# Patient Record
Sex: MALE | Race: WHITE | Employment: FULL TIME | URBAN - METROPOLITAN AREA
[De-identification: names, ages, dates, MRNs, and addresses within clinical notes are randomized per-mention and may not be internally consistent; named-entity substitution may affect disease eponyms.]

---

## 2023-05-01 ENCOUNTER — HOSPITAL ENCOUNTER (OUTPATIENT)
Dept: CT IMAGING | Age: 43
Discharge: HOME | End: 2023-05-04
Payer: COMMERCIAL

## 2023-05-01 VITALS
OXYGEN SATURATION: 98 % | DIASTOLIC BLOOD PRESSURE: 75 MMHG | SYSTOLIC BLOOD PRESSURE: 134 MMHG | RESPIRATION RATE: 14 BRPM | HEART RATE: 70 BPM

## 2023-05-01 DIAGNOSIS — R07.2 PRECORDIAL PAIN: ICD-10-CM

## 2023-05-01 PROCEDURE — 6360000004 HC RX CONTRAST MEDICATION: Performed by: INTERNAL MEDICINE

## 2023-05-01 PROCEDURE — 3209999900 CT CARDIAC OVER READ

## 2023-05-01 PROCEDURE — 6370000000 HC RX 637 (ALT 250 FOR IP): Performed by: INTERNAL MEDICINE

## 2023-05-01 PROCEDURE — 75574 CT ANGIO HRT W/3D IMAGE: CPT

## 2023-05-01 RX ORDER — NITROGLYCERIN 0.4 MG/1
0.4 TABLET SUBLINGUAL ONCE
Status: COMPLETED | OUTPATIENT
Start: 2023-05-01 | End: 2023-05-01

## 2023-05-01 RX ADMIN — IOPAMIDOL 100 ML: 755 INJECTION, SOLUTION INTRAVENOUS at 07:48

## 2023-05-01 RX ADMIN — NITROGLYCERIN 0.4 MG: 0.4 TABLET, ORALLY DISINTEGRATING SUBLINGUAL at 08:05

## 2023-05-02 PROCEDURE — 75574 CT ANGIO HRT W/3D IMAGE: CPT | Performed by: INTERNAL MEDICINE

## 2023-05-03 ENCOUNTER — TELEPHONE (OUTPATIENT)
Dept: CARDIOLOGY CLINIC | Age: 43
End: 2023-05-03

## 2023-09-07 ENCOUNTER — OFFICE VISIT (OUTPATIENT)
Age: 43
End: 2023-09-07
Payer: COMMERCIAL

## 2023-09-07 VITALS
HEIGHT: 72 IN | DIASTOLIC BLOOD PRESSURE: 88 MMHG | WEIGHT: 171 LBS | HEART RATE: 75 BPM | BODY MASS INDEX: 23.16 KG/M2 | SYSTOLIC BLOOD PRESSURE: 132 MMHG

## 2023-09-07 DIAGNOSIS — I49.3 PVC (PREMATURE VENTRICULAR CONTRACTION): ICD-10-CM

## 2023-09-07 DIAGNOSIS — I10 PRIMARY HYPERTENSION: Primary | ICD-10-CM

## 2023-09-07 PROCEDURE — 99214 OFFICE O/P EST MOD 30 MIN: CPT | Performed by: INTERNAL MEDICINE

## 2023-09-07 PROCEDURE — 3075F SYST BP GE 130 - 139MM HG: CPT | Performed by: INTERNAL MEDICINE

## 2023-09-07 PROCEDURE — 3079F DIAST BP 80-89 MM HG: CPT | Performed by: INTERNAL MEDICINE

## 2023-09-07 RX ORDER — VORTIOXETINE 20 MG/1
TABLET, FILM COATED ORAL
COMMUNITY
Start: 2023-07-25

## 2023-09-07 RX ORDER — AMLODIPINE BESYLATE 5 MG/1
5 TABLET ORAL DAILY
Qty: 30 TABLET | Refills: 12 | Status: SHIPPED | OUTPATIENT
Start: 2023-09-07

## 2023-09-07 RX ORDER — FLECAINIDE ACETATE 100 MG/1
100 TABLET ORAL 2 TIMES DAILY
Qty: 30 TABLET | Refills: 5 | Status: SHIPPED | OUTPATIENT
Start: 2023-09-07

## 2023-09-07 RX ORDER — LOSARTAN POTASSIUM 50 MG/1
50 TABLET ORAL 2 TIMES DAILY
Qty: 60 TABLET | Refills: 11 | Status: SHIPPED | OUTPATIENT
Start: 2023-09-07

## 2023-09-07 RX ORDER — BUTALBITAL, ACETAMINOPHEN AND CAFFEINE 50; 325; 40 MG/1; MG/1; MG/1
1 TABLET ORAL DAILY PRN
COMMUNITY
Start: 2022-10-19

## 2023-09-07 NOTE — PROGRESS NOTES
[facility] CARDIOLOGY  [id number] [place] [address]  PHONE: [phone]      23    NAME:  Lashaun Quinn  : 1980  MRN: [id number]         SUBJECTIVE:   Lashaun Quinn is a 41 y.o. male seen for a follow up visit regarding the following:     Chief Complaint   Patient presents with    Follow-up    Irregular Heart Beat            HPI:  Follow up  Follow-up and Irregular Heart Beat   . Mr. Jaya Cope presents today for follow-up. Palpitations are slightly improved with the flecainide. Continues to have chest discomfort. No new complaints today. Irregular Heart Beat   Associated symptoms include an irregular heartbeat. Key CAD CHF Meds            amLODIPine (NORVASC) 5 MG tablet (Taking)    Sig - Route: Take 1 tablet by mouth daily Indications: AS NEEDED - Oral    losartan (COZAAR) 50 MG tablet (Taking)    Sig - Route: Take 1 tablet by mouth in the morning and at bedtime - Oral    propranolol (INDERAL XL) 80 MG extended release capsule (Taking)    Sig - Route: Take 1 capsule by mouth 2 times daily - Oral    pravastatin (PRAVACHOL) 20 MG tablet (Taking)    Class: Historical Med          Key Antihyperglycemic Medications       Patient is on no antihyperglycemic meds. Past Medical History, Past Surgical History, Family history, Social History, and Medications were all reviewed with the patient today and updated as necessary. Prior to Admission medications    Medication Sig Start Date End Date Taking?  Authorizing Provider   butalbital-acetaminophen-caffeine (FIORICET, ESGIC) -36 MG per tablet Take 1 tablet by mouth daily as needed 10/19/22  Yes Historical Provider, MD   TRINTELLIX 20 MG TABS tablet  23  Yes Historical Provider, MD   flecainide (TAMBOCOR) 100 MG tablet Take 1 tablet by mouth 2 times daily 23  Yes Ziggy Luna III, MD   amLODIPine (NORVASC) 5 MG tablet Take 1 tablet by mouth daily Indications: AS NEEDED 23  Yes

## 2024-07-26 DIAGNOSIS — I49.3 PVC (PREMATURE VENTRICULAR CONTRACTION): ICD-10-CM

## 2024-07-26 NOTE — TELEPHONE ENCOUNTER
MEDICATION REFILL REQUEST      Name of Medication:  Flecainide  Dose:  50 mg  Frequency:  BID  Quantity:  60  Days' supply:  30 with refills      Pharmacy Name/Location:  [id number]

## 2024-07-26 NOTE — TELEPHONE ENCOUNTER
Requested Prescriptions     Pending Prescriptions Disp Refills    flecainide (TAMBOCOR) 100 MG tablet 180 tablet 0     Sig: Take 1 tablet by mouth 2 times daily

## 2024-07-29 RX ORDER — FLECAINIDE ACETATE 100 MG/1
100 TABLET ORAL 2 TIMES DAILY
Qty: 180 TABLET | Refills: 0 | Status: SHIPPED | OUTPATIENT
Start: 2024-07-29

## 2024-08-14 ENCOUNTER — OFFICE VISIT (OUTPATIENT)
Age: 44
End: 2024-08-14

## 2024-08-14 VITALS
HEART RATE: 57 BPM | DIASTOLIC BLOOD PRESSURE: 94 MMHG | BODY MASS INDEX: 26.25 KG/M2 | WEIGHT: 193.8 LBS | HEIGHT: 72 IN | SYSTOLIC BLOOD PRESSURE: 148 MMHG

## 2024-08-14 DIAGNOSIS — R07.2 PRECORDIAL PAIN: ICD-10-CM

## 2024-08-14 DIAGNOSIS — I49.3 PVC (PREMATURE VENTRICULAR CONTRACTION): ICD-10-CM

## 2024-08-14 DIAGNOSIS — G47.00 INSOMNIA, UNSPECIFIED TYPE: ICD-10-CM

## 2024-08-14 DIAGNOSIS — I10 PRIMARY HYPERTENSION: Primary | ICD-10-CM

## 2024-08-14 DIAGNOSIS — E78.2 MIXED HYPERLIPIDEMIA: ICD-10-CM

## 2024-08-14 RX ORDER — METOPROLOL SUCCINATE 50 MG/1
50 TABLET, EXTENDED RELEASE ORAL DAILY
Qty: 90 TABLET | Refills: 3 | Status: SHIPPED | OUTPATIENT
Start: 2024-08-14 | End: 2025-08-09

## 2024-08-14 RX ORDER — DEXTROAMPHETAMINE SACCHARATE, AMPHETAMINE ASPARTATE MONOHYDRATE, DEXTROAMPHETAMINE SULFATE AND AMPHETAMINE SULFATE 7.5; 7.5; 7.5; 7.5 MG/1; MG/1; MG/1; MG/1
1 CAPSULE, EXTENDED RELEASE ORAL EVERY MORNING
COMMUNITY
Start: 2024-07-29

## 2024-08-14 RX ORDER — LOSARTAN POTASSIUM AND HYDROCHLOROTHIAZIDE 12.5; 5 MG/1; MG/1
1 TABLET ORAL DAILY
Qty: 90 TABLET | Refills: 3 | Status: SHIPPED | OUTPATIENT
Start: 2024-08-14 | End: 2025-08-09

## 2024-08-14 RX ORDER — TIZANIDINE 4 MG/1
4 TABLET ORAL 2 TIMES DAILY
Qty: 180 TABLET | Refills: 3 | Status: SHIPPED | OUTPATIENT
Start: 2024-08-14 | End: 2025-08-09

## 2024-08-22 ASSESSMENT — ENCOUNTER SYMPTOMS
SHORTNESS OF BREATH: 0
ABDOMINAL PAIN: 0

## 2024-08-22 NOTE — PROGRESS NOTES
[facility] CARDIOLOGY  [address] [place] [address], [address]  [place] [address]  PHONE: [phone]      24    NAME:  Segun Dang  : 1980  MRN: [id number]         SUBJECTIVE:   Segun Dang is a 44 y.o. male seen for a follow up visit regarding the following:     Chief Complaint   Patient presents with    Annual Exam    Hypertension            HPI:  Follow up  Annual Exam and Hypertension   .    Mr. Dang presents today for follow-up.  Palpitations are slightly improved with the flecainide.  Chest pain is improved.  Blood pressure is bit of an issue, reports persistently elevated readings, in the neighborhood where he was today at 148/94.  No other complaints today.    Irregular Heart Beat   Associated symptoms include an irregular heartbeat. Pertinent negatives include no diaphoresis, fever or shortness of breath.   Hypertension  Pertinent negatives include no shortness of breath.           Cardiac Medications       Calcium Channel Blockers       amLODIPine (NORVASC) 5 MG tablet Take 1 tablet by mouth daily Indications: AS NEEDED       Antihypertensive Combinations       losartan-hydroCHLOROthiazide (HYZAAR) 50-12.5 MG per tablet Take 1 tablet by mouth daily       Beta Blockers Cardio-Selective       metoprolol succinate (TOPROL XL) 50 MG extended release tablet Take 1 tablet by mouth daily       Antiarrhythmics Type I-C       flecainide (TAMBOCOR) 100 MG tablet Take 1 tablet by mouth 2 times daily                    Past Medical History, Past Surgical History, Family history, Social History, and Medications were all reviewed with the patient today and updated as necessary.     Prior to Admission medications    Medication Sig Start Date End Date Taking? Authorizing Provider   amphetamine-dextroamphetamine (ADDERALL XR) 30 MG extended release capsule Take 1 capsule by mouth every morning. 24  Yes Provider, MD Brooke   triazolam (HALCION) 0.25 MG tablet Take 1 tablet by mouth in the

## 2025-01-15 DIAGNOSIS — I49.3 PVC (PREMATURE VENTRICULAR CONTRACTION): ICD-10-CM

## 2025-01-15 DIAGNOSIS — I10 PRIMARY HYPERTENSION: ICD-10-CM

## 2025-01-15 NOTE — TELEPHONE ENCOUNTER
Requested Prescriptions     Pending Prescriptions Disp Refills    propranolol (INDERAL LA) 80 MG extended release capsule 180 capsule 3     Sig: Take 1 capsule by mouth in the morning and at bedtime    losartan (COZAAR) 50 MG tablet 180 tablet 3     Sig: Take 1 tablet by mouth in the morning and at bedtime    amLODIPine (NORVASC) 5 MG tablet 90 tablet 3     Sig: Take 1 tablet by mouth daily Indications: AS NEEDED

## 2025-01-15 NOTE — TELEPHONE ENCOUNTER
Per OV on 8/14/24:     Blood pressure elevated, will add HCTZ to his losartan.  Will transition his propranolol to metoprolol.  Will plan to see him back in 1 year, or sooner if new issues arise.    Patient states he is self pay and his medication changes at last visit aren't controlling his BP. He is requesting to switch back to Propanol 80 mg bid,   Losartan 50 mg bid, and Amlodipine 5 mg prn all 90 day supplies.     Ok to change? Please advise.

## 2025-01-16 RX ORDER — PROPRANOLOL HYDROCHLORIDE 80 MG/1
80 CAPSULE, EXTENDED RELEASE ORAL 2 TIMES DAILY
Qty: 180 CAPSULE | Refills: 3 | Status: SHIPPED | OUTPATIENT
Start: 2025-01-16

## 2025-01-16 RX ORDER — LOSARTAN POTASSIUM 50 MG/1
50 TABLET ORAL 2 TIMES DAILY
Qty: 180 TABLET | Refills: 3 | Status: SHIPPED | OUTPATIENT
Start: 2025-01-16

## 2025-01-16 RX ORDER — AMLODIPINE BESYLATE 5 MG/1
5 TABLET ORAL DAILY
Qty: 90 TABLET | Refills: 3 | Status: SHIPPED | OUTPATIENT
Start: 2025-01-16